# Patient Record
Sex: FEMALE | Race: WHITE | NOT HISPANIC OR LATINO | URBAN - METROPOLITAN AREA
[De-identification: names, ages, dates, MRNs, and addresses within clinical notes are randomized per-mention and may not be internally consistent; named-entity substitution may affect disease eponyms.]

---

## 2020-06-25 ENCOUNTER — OFFICE VISIT (OUTPATIENT)
Dept: URBAN - METROPOLITAN AREA CLINIC 113 | Facility: CLINIC | Age: 35
End: 2020-06-25

## 2020-06-30 ENCOUNTER — CLAIMS CREATED FROM THE CLAIM WINDOW (OUTPATIENT)
Dept: URBAN - METROPOLITAN AREA CLINIC 4 | Facility: CLINIC | Age: 35
End: 2020-06-30
Payer: COMMERCIAL

## 2020-06-30 ENCOUNTER — OFFICE VISIT (OUTPATIENT)
Dept: URBAN - METROPOLITAN AREA SURGERY CENTER 25 | Facility: SURGERY CENTER | Age: 35
End: 2020-06-30

## 2020-06-30 DIAGNOSIS — K22.719 BARRETT'S ESOPHAGUS WITH DYSPLASIA, UNSPECIFIED: ICD-10-CM

## 2020-06-30 DIAGNOSIS — K90.9 INTESTINAL MALABSORPTION, UNSPECIFIED: ICD-10-CM

## 2020-06-30 DIAGNOSIS — R10.9 UNSPECIFIED ABDOMINAL PAIN: ICD-10-CM

## 2020-06-30 PROCEDURE — 88305 TISSUE EXAM BY PATHOLOGIST: CPT | Performed by: PATHOLOGY

## 2020-07-10 ENCOUNTER — OFFICE VISIT (OUTPATIENT)
Dept: URBAN - METROPOLITAN AREA CLINIC 113 | Facility: CLINIC | Age: 35
End: 2020-07-10

## 2020-07-14 ENCOUNTER — OFFICE VISIT (OUTPATIENT)
Dept: URBAN - METROPOLITAN AREA CLINIC 113 | Facility: CLINIC | Age: 35
End: 2020-07-14

## 2020-07-20 ENCOUNTER — OFFICE VISIT (OUTPATIENT)
Dept: URBAN - METROPOLITAN AREA CLINIC 113 | Facility: CLINIC | Age: 35
End: 2020-07-20

## 2020-07-25 ENCOUNTER — TELEPHONE ENCOUNTER (OUTPATIENT)
Dept: URBAN - METROPOLITAN AREA CLINIC 13 | Facility: CLINIC | Age: 35
End: 2020-07-25

## 2020-07-25 RX ORDER — LOSARTAN POTASSIUM 50 MG/1
TAKE 1 TABLET DAILY TABLET, FILM COATED ORAL
Refills: 0 | OUTPATIENT
End: 2020-06-25

## 2020-07-25 RX ORDER — DICYCLOMINE HYDROCHLORIDE 20 MG/1
TAKE 1 TABLET EVERY 6 HOURS PRN TABLET ORAL
Refills: 0 | OUTPATIENT
Start: 2020-05-12 | End: 2020-07-20

## 2020-07-25 RX ORDER — PANTOPRAZOLE SODIUM 40 MG/1
TAKE 1 CAPSULE BY MOUTH ONCE DAILY TABLET, DELAYED RELEASE ORAL
Qty: 30 | Refills: 3 | OUTPATIENT
Start: 2020-04-28 | End: 2020-06-25

## 2020-07-25 RX ORDER — ONDANSETRON 4 MG/1
DISSOLVE 1 TABLET BY MOUTH EVERY 6 HOURS AS NEEDED FOR NAUSEA TABLET, ORALLY DISINTEGRATING ORAL
Qty: 30 | Refills: 0 | OUTPATIENT
Start: 2020-04-28 | End: 2020-06-25

## 2020-07-25 RX ORDER — METOCLOPRAMIDE 10 MG/1
TAKE 1 TABLET BY MOUTH TWICE A DAY TABLET ORAL
Qty: 20 | Refills: 0 | OUTPATIENT
Start: 2020-05-12 | End: 2020-06-30

## 2020-07-26 ENCOUNTER — TELEPHONE ENCOUNTER (OUTPATIENT)
Dept: URBAN - METROPOLITAN AREA CLINIC 13 | Facility: CLINIC | Age: 35
End: 2020-07-26

## 2020-07-26 RX ORDER — FLUOXETINE HCL 20 MG
TAKE 1 CAPSULE DAILY CAPSULE ORAL
Refills: 0 | Status: ACTIVE | COMMUNITY

## 2020-07-26 RX ORDER — PROMETHAZINE HYDROCHLORIDE 25 MG/1
INSERT 1 SUPPOSITORY RECTALLY EVERY 6 HOURS AS NEEDED FOR NAUSEA/VOMIT SUPPOSITORY RECTAL
Qty: 10 | Refills: 0 | Status: ACTIVE | COMMUNITY
Start: 2020-05-12

## 2020-07-26 RX ORDER — ONDANSETRON 8 MG/1
PLACE 1 TAB UNDER TONGUE EVERY 8 HOURS AS NEEDED FOR NAUSEA/VOMITING TABLET, ORALLY DISINTEGRATING ORAL
Qty: 8 | Refills: 0 | Status: ACTIVE | COMMUNITY
Start: 2019-07-27

## 2020-07-26 RX ORDER — CIPROFLOXACIN AND DEXAMETHASONE 3; 1 MG/ML; MG/ML
PLACE 4 TO 5 DROPS IN LEFT EAR TWICE A DAY FOR 7 DAYS SUSPENSION/ DROPS AURICULAR (OTIC)
Qty: 8 | Refills: 0 | Status: ACTIVE | COMMUNITY
Start: 2020-05-18

## 2020-07-26 RX ORDER — HYOSCYAMINE SULFATE 0.12 MG/1
TABLET, ORALLY DISINTEGRATING ORAL
Refills: 0 | Status: ACTIVE | COMMUNITY

## 2020-07-26 RX ORDER — PANTOPRAZOLE SODIUM 40 MG/1
TAKE 1 TABLET TWICE DAILY TABLET, DELAYED RELEASE ORAL
Qty: 60 | Refills: 11 | Status: ACTIVE | COMMUNITY
Start: 2020-06-30

## 2020-07-26 RX ORDER — LEVOTHYROXINE SODIUM 175 UG/1
TAKE 1 TABLET DAILY TABLET ORAL
Refills: 0 | Status: ACTIVE | COMMUNITY

## 2020-07-26 RX ORDER — ALPRAZOLAM 0.5 MG
TAKE 1 TABLET 3 TIMES DAILY AS NEEDED TABLET ORAL
Refills: 0 | Status: ACTIVE | COMMUNITY

## 2020-07-26 RX ORDER — NYSTATIN 100000 [USP'U]/ML
TAKE 5 ML BY MOUTH 3 TIMES A DAY SUSPENSION ORAL
Qty: 60 | Refills: 0 | Status: ACTIVE | COMMUNITY
Start: 2020-04-08

## 2020-07-26 RX ORDER — NORETHINDRONE ACETATE AND ETHINYL ESTRADIOL 1; 20 MG/1; UG/1
TAKE 1 TABLET DAILY TABLET ORAL
Refills: 0 | Status: ACTIVE | COMMUNITY

## 2020-07-26 RX ORDER — SODIUM SULFATE, POTASSIUM SULFATE, MAGNESIUM SULFATE 17.5; 3.13; 1.6 G/ML; G/ML; G/ML
USE AS DIRECTED SOLUTION, CONCENTRATE ORAL
Qty: 1 | Refills: 0 | Status: ACTIVE | COMMUNITY
Start: 2020-07-14

## 2020-07-26 RX ORDER — ERGOCALCIFEROL 1.25 MG/1
TAKE 1 CAPSULE ONCE PER WEEK CAPSULE ORAL
Qty: 4 | Refills: 0 | Status: ACTIVE | COMMUNITY

## 2020-07-26 RX ORDER — PROMETHAZINE HYDROCHLORIDE AND DEXTROMETHORPHAN HYDROBROMIDE 6.25; 15 MG/5ML; MG/5ML
TAKE 5 ML BY MOUTH EVERY 6 HOURS AS NEEDED FOR COUGH FOR 3 DAYS SOLUTION ORAL
Qty: 60 | Refills: 0 | Status: ACTIVE | COMMUNITY
Start: 2019-05-28

## 2020-07-30 ENCOUNTER — OFFICE VISIT (OUTPATIENT)
Dept: URBAN - METROPOLITAN AREA CLINIC 13 | Facility: CLINIC | Age: 35
End: 2020-07-30

## 2020-09-03 ENCOUNTER — OFFICE VISIT (OUTPATIENT)
Dept: URBAN - METROPOLITAN AREA SURGERY CENTER 25 | Facility: SURGERY CENTER | Age: 35
End: 2020-09-03
Payer: COMMERCIAL

## 2020-09-03 DIAGNOSIS — R93.3 ABN FINDINGS-GI TRACT: ICD-10-CM

## 2020-09-03 PROCEDURE — 45378 DIAGNOSTIC COLONOSCOPY: CPT | Performed by: INTERNAL MEDICINE

## 2020-09-03 RX ORDER — NORETHINDRONE ACETATE AND ETHINYL ESTRADIOL 1; 20 MG/1; UG/1
TAKE 1 TABLET DAILY TABLET ORAL
Refills: 0 | Status: ACTIVE | COMMUNITY

## 2020-09-03 RX ORDER — CIPROFLOXACIN AND DEXAMETHASONE 3; 1 MG/ML; MG/ML
PLACE 4 TO 5 DROPS IN LEFT EAR TWICE A DAY FOR 7 DAYS SUSPENSION/ DROPS AURICULAR (OTIC)
Qty: 8 | Refills: 0 | Status: ACTIVE | COMMUNITY
Start: 2020-05-18

## 2020-09-03 RX ORDER — ONDANSETRON 8 MG/1
PLACE 1 TAB UNDER TONGUE EVERY 8 HOURS AS NEEDED FOR NAUSEA/VOMITING TABLET, ORALLY DISINTEGRATING ORAL
Qty: 8 | Refills: 0 | Status: ACTIVE | COMMUNITY
Start: 2019-07-27

## 2020-09-03 RX ORDER — FLUOXETINE HCL 20 MG
TAKE 1 CAPSULE DAILY CAPSULE ORAL
Refills: 0 | Status: ACTIVE | COMMUNITY

## 2020-09-03 RX ORDER — ERGOCALCIFEROL 1.25 MG/1
TAKE 1 CAPSULE ONCE PER WEEK CAPSULE ORAL
Qty: 4 | Refills: 0 | Status: ACTIVE | COMMUNITY

## 2020-09-03 RX ORDER — ALPRAZOLAM 0.5 MG
TAKE 1 TABLET 3 TIMES DAILY AS NEEDED TABLET ORAL
Refills: 0 | Status: ACTIVE | COMMUNITY

## 2020-09-03 RX ORDER — LEVOTHYROXINE SODIUM 175 UG/1
TAKE 1 TABLET DAILY TABLET ORAL
Refills: 0 | Status: ACTIVE | COMMUNITY

## 2020-09-03 RX ORDER — SODIUM SULFATE, POTASSIUM SULFATE, MAGNESIUM SULFATE 17.5; 3.13; 1.6 G/ML; G/ML; G/ML
USE AS DIRECTED SOLUTION, CONCENTRATE ORAL
Qty: 1 | Refills: 0 | Status: ACTIVE | COMMUNITY
Start: 2020-07-14

## 2020-09-03 RX ORDER — NYSTATIN 100000 [USP'U]/ML
TAKE 5 ML BY MOUTH 3 TIMES A DAY SUSPENSION ORAL
Qty: 60 | Refills: 0 | Status: ACTIVE | COMMUNITY
Start: 2020-04-08

## 2020-09-03 RX ORDER — PROMETHAZINE HYDROCHLORIDE 25 MG/1
INSERT 1 SUPPOSITORY RECTALLY EVERY 6 HOURS AS NEEDED FOR NAUSEA/VOMIT SUPPOSITORY RECTAL
Qty: 10 | Refills: 0 | Status: ACTIVE | COMMUNITY
Start: 2020-05-12

## 2020-09-03 RX ORDER — HYOSCYAMINE SULFATE 0.12 MG/1
TABLET, ORALLY DISINTEGRATING ORAL
Refills: 0 | Status: ACTIVE | COMMUNITY

## 2020-09-03 RX ORDER — PROMETHAZINE HYDROCHLORIDE AND DEXTROMETHORPHAN HYDROBROMIDE 6.25; 15 MG/5ML; MG/5ML
TAKE 5 ML BY MOUTH EVERY 6 HOURS AS NEEDED FOR COUGH FOR 3 DAYS SOLUTION ORAL
Qty: 60 | Refills: 0 | Status: ACTIVE | COMMUNITY
Start: 2019-05-28

## 2020-09-03 RX ORDER — PANTOPRAZOLE SODIUM 40 MG/1
TAKE 1 TABLET TWICE DAILY TABLET, DELAYED RELEASE ORAL
Qty: 60 | Refills: 11 | Status: ACTIVE | COMMUNITY
Start: 2020-06-30

## 2020-10-19 ENCOUNTER — WEB ENCOUNTER (OUTPATIENT)
Dept: URBAN - METROPOLITAN AREA CLINIC 113 | Facility: CLINIC | Age: 35
End: 2020-10-19

## 2020-10-19 ENCOUNTER — OFFICE VISIT (OUTPATIENT)
Dept: URBAN - METROPOLITAN AREA CLINIC 113 | Facility: CLINIC | Age: 35
End: 2020-10-19
Payer: COMMERCIAL

## 2020-10-19 VITALS
DIASTOLIC BLOOD PRESSURE: 83 MMHG | WEIGHT: 239 LBS | BODY MASS INDEX: 43.98 KG/M2 | TEMPERATURE: 97.8 F | RESPIRATION RATE: 18 BRPM | HEART RATE: 58 BPM | HEIGHT: 62 IN | SYSTOLIC BLOOD PRESSURE: 125 MMHG

## 2020-10-19 DIAGNOSIS — K31.84 GASTROPARESIS: ICD-10-CM

## 2020-10-19 DIAGNOSIS — R10.9 UNSPECIFIED ABDOMINAL PAIN: ICD-10-CM

## 2020-10-19 PROBLEM — 302914006 BARRETT'S ESOPHAGUS: Status: ACTIVE | Noted: 2020-10-19

## 2020-10-19 PROBLEM — 235675006 GASTROPARESIS: Status: ACTIVE | Noted: 2020-10-19

## 2020-10-19 PROBLEM — 21522001 ABDOMINAL PAIN: Status: ACTIVE | Noted: 2020-10-19

## 2020-10-19 PROCEDURE — 99213 OFFICE O/P EST LOW 20 MIN: CPT | Performed by: NURSE PRACTITIONER

## 2020-10-19 PROCEDURE — G8430 EC AT DOC MEDREC PT NOT ELIG: HCPCS | Performed by: NURSE PRACTITIONER

## 2020-10-19 PROCEDURE — G8483 FLU IMM NO ADMIN DOC REA: HCPCS | Performed by: NURSE PRACTITIONER

## 2020-10-19 PROCEDURE — G9903 PT SCRN TBCO ID AS NON USER: HCPCS | Performed by: NURSE PRACTITIONER

## 2020-10-19 RX ORDER — SODIUM SULFATE, POTASSIUM SULFATE, MAGNESIUM SULFATE 17.5; 3.13; 1.6 G/ML; G/ML; G/ML
USE AS DIRECTED SOLUTION, CONCENTRATE ORAL
Qty: 1 | Refills: 0 | Status: DISCONTINUED | COMMUNITY
Start: 2020-07-14

## 2020-10-19 RX ORDER — NYSTATIN 100000 [USP'U]/ML
TAKE 5 ML BY MOUTH 3 TIMES A DAY SUSPENSION ORAL
Qty: 60 | Refills: 0 | Status: DISCONTINUED | COMMUNITY
Start: 2020-04-08

## 2020-10-19 RX ORDER — CIPROFLOXACIN AND DEXAMETHASONE 3; 1 MG/ML; MG/ML
PLACE 4 TO 5 DROPS IN LEFT EAR TWICE A DAY FOR 7 DAYS SUSPENSION/ DROPS AURICULAR (OTIC)
Qty: 8 | Refills: 0 | Status: ACTIVE | COMMUNITY
Start: 2020-05-18

## 2020-10-19 RX ORDER — PANTOPRAZOLE SODIUM 40 MG/1
TAKE 1 TABLET TWICE DAILY TABLET, DELAYED RELEASE ORAL
Qty: 60 | Refills: 11 | Status: ACTIVE | COMMUNITY
Start: 2020-06-30

## 2020-10-19 RX ORDER — PROMETHAZINE HYDROCHLORIDE 25 MG/1
INSERT 1 SUPPOSITORY RECTALLY EVERY 6 HOURS AS NEEDED FOR NAUSEA/VOMIT SUPPOSITORY RECTAL
Qty: 10 | Refills: 0 | Status: ON HOLD | COMMUNITY
Start: 2020-05-12

## 2020-10-19 RX ORDER — ONDANSETRON 8 MG/1
PLACE 1 TAB UNDER TONGUE EVERY 8 HOURS AS NEEDED FOR NAUSEA/VOMITING TABLET, ORALLY DISINTEGRATING ORAL
Qty: 8 | Refills: 0 | Status: DISCONTINUED | COMMUNITY
Start: 2019-07-27

## 2020-10-19 RX ORDER — ERGOCALCIFEROL 1.25 MG/1
TAKE 1 CAPSULE ONCE PER WEEK CAPSULE ORAL
Qty: 4 | Refills: 0 | Status: ACTIVE | COMMUNITY

## 2020-10-19 RX ORDER — ALPRAZOLAM 0.5 MG
TAKE 1 TABLET 3 TIMES DAILY AS NEEDED TABLET ORAL
Refills: 0 | Status: ON HOLD | COMMUNITY

## 2020-10-19 RX ORDER — PROMETHAZINE HYDROCHLORIDE AND DEXTROMETHORPHAN HYDROBROMIDE 6.25; 15 MG/5ML; MG/5ML
TAKE 5 ML BY MOUTH EVERY 6 HOURS AS NEEDED FOR COUGH FOR 3 DAYS SOLUTION ORAL
Qty: 60 | Refills: 0 | Status: ACTIVE | COMMUNITY
Start: 2019-05-28

## 2020-10-19 RX ORDER — NORETHINDRONE ACETATE AND ETHINYL ESTRADIOL 1; 20 MG/1; UG/1
TAKE 1 TABLET DAILY TABLET ORAL
Refills: 0 | Status: ACTIVE | COMMUNITY

## 2020-10-19 RX ORDER — HYOSCYAMINE SULFATE 0.12 MG/1
TABLET, ORALLY DISINTEGRATING ORAL
Refills: 0 | Status: DISCONTINUED | COMMUNITY

## 2020-10-19 RX ORDER — LEVOTHYROXINE SODIUM 175 UG/1
TAKE 1 TABLET DAILY TABLET ORAL
Refills: 0 | Status: ACTIVE | COMMUNITY

## 2020-10-19 RX ORDER — FLUOXETINE HCL 20 MG
TAKE 1 CAPSULE DAILY CAPSULE ORAL
Refills: 0 | Status: ACTIVE | COMMUNITY

## 2020-10-19 NOTE — HPI-TODAY'S VISIT:
Ms. Will is a 35-year-old female with a history of hypertension and hypothyroidism, longstanding history of intermittent episodes of nausea and vomiting, presenting for 3-month follow-up. She was last seen in the office on 7/20/2020 regarding chronic, intermittent nausea and vomiting.  Work-up had included labs, CT imaging and EGD.  EGD was notable for LA grade B esophagitis, and GE junction biopsies demonstrating focal intestinal metaplasia consistent with Jha's esophagus.  Labs were unremarkable for pancreas or liver disease, or celiac disease.  She had correlated episodes of nausea and vomiting with hormonal fluctuation associated with menstrual cycles.  She was planned for a 4-hour gastric emptying study.  She was to continue supportive treatment with pantoprazole 40 mg daily, promethazine as needed and Levsin as needed.  She was to proceed with colonoscopy as scheduled to further evaluate CT evidence of colonic thickening. Colonoscopy was performed 9/3/2020 which was notable for an excellent bowel preparation, mild to moderate sigmoid diverticulosis, mild external and internal hemorrhoids, normal ileum, otherwise normal colonoscopy to the terminal ileum. Gastric emptying study on 7/30/2020 demonstrated findings consistent with delayed gastric emptying with grade 4 (very severe) gastric retention at 4 hours.  Since learning of her gastroparesis, she started following a diet for gastroparesis and has noted significant improvement. She has not had any recurrent episodes of nausea since making these changes. She is pleased with her weight loss as well. No abdominal pain. Bowels are moving normally. No blood per rectum.

## 2021-07-08 ENCOUNTER — ERX REFILL RESPONSE (OUTPATIENT)
Dept: URBAN - METROPOLITAN AREA CLINIC 113 | Facility: CLINIC | Age: 36
End: 2021-07-08

## 2021-07-08 RX ORDER — PANTOPRAZOLE SODIUM 40 MG/1
TAKE ONE TABLET BY MOUTH TWICE A DAY TABLET, DELAYED RELEASE ORAL
Qty: 60 TABLET | Refills: 11 | OUTPATIENT

## 2022-03-17 ENCOUNTER — ERX REFILL RESPONSE (OUTPATIENT)
Dept: URBAN - METROPOLITAN AREA CLINIC 113 | Facility: CLINIC | Age: 37
End: 2022-03-17

## 2022-03-17 RX ORDER — PANTOPRAZOLE SODIUM 40 MG/1
TAKE ONE TABLET BY MOUTH TWICE A DAY TABLET, DELAYED RELEASE ORAL
Qty: 180 TABLET | Refills: 4 | OUTPATIENT

## 2022-03-17 RX ORDER — PANTOPRAZOLE SODIUM 40 MG/1
TAKE ONE TABLET BY MOUTH TWICE A DAY TABLET, DELAYED RELEASE ORAL
Qty: 180 TABLET | Refills: 0 | OUTPATIENT

## 2023-04-13 ENCOUNTER — TELEPHONE ENCOUNTER (OUTPATIENT)
Dept: URBAN - METROPOLITAN AREA CLINIC 113 | Facility: CLINIC | Age: 38
End: 2023-04-13

## 2023-04-13 RX ORDER — PANTOPRAZOLE SODIUM 40 MG/1
TAKE ONE TABLET BY MOUTH TWICE A DAY TABLET, DELAYED RELEASE ORAL
Qty: 30 | Refills: 0

## 2023-05-16 ENCOUNTER — DASHBOARD ENCOUNTERS (OUTPATIENT)
Age: 38
End: 2023-05-16

## 2023-05-16 ENCOUNTER — OFFICE VISIT (OUTPATIENT)
Dept: URBAN - METROPOLITAN AREA CLINIC 113 | Facility: CLINIC | Age: 38
End: 2023-05-16
Payer: COMMERCIAL

## 2023-05-16 VITALS
TEMPERATURE: 97.5 F | WEIGHT: 274 LBS | HEART RATE: 75 BPM | DIASTOLIC BLOOD PRESSURE: 92 MMHG | BODY MASS INDEX: 50.42 KG/M2 | HEIGHT: 62 IN | RESPIRATION RATE: 14 BRPM | SYSTOLIC BLOOD PRESSURE: 137 MMHG

## 2023-05-16 DIAGNOSIS — K31.84 GASTROPARESIS: ICD-10-CM

## 2023-05-16 DIAGNOSIS — K21.9 GERD WITHOUT ESOPHAGITIS: ICD-10-CM

## 2023-05-16 DIAGNOSIS — R63.5 WEIGHT GAIN: ICD-10-CM

## 2023-05-16 PROBLEM — 266435005: Status: ACTIVE | Noted: 2023-05-16

## 2023-05-16 PROCEDURE — 99213 OFFICE O/P EST LOW 20 MIN: CPT | Performed by: INTERNAL MEDICINE

## 2023-05-16 RX ORDER — PROMETHAZINE HYDROCHLORIDE 25 MG/1
INSERT 1 SUPPOSITORY RECTALLY EVERY 6 HOURS AS NEEDED FOR NAUSEA/VOMIT SUPPOSITORY RECTAL
Qty: 10 | Refills: 0 | Status: ON HOLD | COMMUNITY
Start: 2020-05-12

## 2023-05-16 RX ORDER — PANTOPRAZOLE SODIUM 40 MG/1
TAKE ONE TABLET BY MOUTH TWICE A DAY TABLET, DELAYED RELEASE ORAL
Qty: 30 | Refills: 0 | Status: ACTIVE | COMMUNITY

## 2023-05-16 RX ORDER — PANTOPRAZOLE SODIUM 40 MG/1
1 TABLET TABLET, DELAYED RELEASE ORAL ONCE A DAY
Qty: 90 TABLET | Refills: 3

## 2023-05-16 RX ORDER — AMLODIPINE BESYLATE 5 MG/1
1 TABLET TABLET ORAL ONCE A DAY
Status: ACTIVE | COMMUNITY

## 2023-05-16 RX ORDER — FLUOXETINE HCL 20 MG
TAKE 1 CAPSULE DAILY CAPSULE ORAL
Refills: 0 | Status: ACTIVE | COMMUNITY

## 2023-05-16 RX ORDER — PROMETHAZINE HYDROCHLORIDE AND DEXTROMETHORPHAN HYDROBROMIDE 6.25; 15 MG/5ML; MG/5ML
TAKE 5 ML BY MOUTH EVERY 6 HOURS AS NEEDED FOR COUGH FOR 3 DAYS SOLUTION ORAL
Qty: 60 | Refills: 0 | Status: ON HOLD | COMMUNITY
Start: 2019-05-28

## 2023-05-16 RX ORDER — CIPROFLOXACIN AND DEXAMETHASONE 3; 1 MG/ML; MG/ML
PLACE 4 TO 5 DROPS IN LEFT EAR TWICE A DAY FOR 7 DAYS SUSPENSION/ DROPS AURICULAR (OTIC)
Qty: 8 | Refills: 0 | Status: ON HOLD | COMMUNITY
Start: 2020-05-18

## 2023-05-16 RX ORDER — ERGOCALCIFEROL 1.25 MG/1
TAKE 1 CAPSULE ONCE PER WEEK CAPSULE ORAL
Qty: 4 | Refills: 0 | Status: ON HOLD | COMMUNITY

## 2023-05-16 RX ORDER — CLONIDINE 0.1 MG/D
1 PATCH TO SKIN PATCH TRANSDERMAL
Status: ACTIVE | COMMUNITY

## 2023-05-16 RX ORDER — NORETHINDRONE ACETATE AND ETHINYL ESTRADIOL 1; 20 MG/1; UG/1
TAKE 1 TABLET DAILY TABLET ORAL
Refills: 0 | Status: ACTIVE | COMMUNITY

## 2023-05-16 RX ORDER — LEVOTHYROXINE SODIUM 175 UG/1
TAKE 1 TABLET DAILY TABLET ORAL
Refills: 0 | Status: ACTIVE | COMMUNITY

## 2023-05-16 RX ORDER — ALPRAZOLAM 0.5 MG
TAKE 1 TABLET 3 TIMES DAILY AS NEEDED TABLET ORAL
Refills: 0 | Status: ON HOLD | COMMUNITY

## 2023-05-16 NOTE — HPI-TODAY'S VISIT:
Ms. Will is a 37-year-old female with a history of hypertension and hypothyroidism, longstanding history of intermittent episodes of nausea and vomiting, presenting for long interval follow up regarding medication refill.  She is feeling well. She continues to avoid foods difficult to digest such as salads and raw vegetables. No nausea or vomiting. She is taking pantoprazole 40 mg daily. No dysphagia or heartburn. Her wieght is increasing. She is up about 30 pounds since we have last seen her. She has been hesitant to start semiglutide injections offered to her by Dr. Mcclellan. She has tried counting calories in the past without longterm success. She is interested in trying to minimize her carbohydrate intake and increasing her exercise regimen.

## 2023-05-16 NOTE — HPI-OTHER HISTORIES
EGD was notable for LA grade B esophagitis, and GE junction biopsies demonstrating focal intestinal metaplasia consistent with Jha's esophagus.   . Labs were unremarkable for pancreas or liver disease, or celiac disease.  . Colonoscopy was performed 9/3/2020 which was notable for an excellent bowel preparation, mild to moderate sigmoid diverticulosis, mild external and internal hemorrhoids, normal ileum, otherwise normal colonoscopy to the terminal ileum. . Gastric emptying study on 7/30/2020 demonstrated findings consistent with delayed gastric emptying with grade 4 (very severe) gastric retention at 4 hours.

## 2023-07-10 ENCOUNTER — TELEPHONE ENCOUNTER (OUTPATIENT)
Dept: URBAN - METROPOLITAN AREA CLINIC 113 | Facility: CLINIC | Age: 38
End: 2023-07-10

## 2023-07-10 RX ORDER — PANTOPRAZOLE SODIUM 40 MG/1
1 TABLET TABLET, DELAYED RELEASE ORAL ONCE A DAY
Qty: 90 TABLET | Refills: 3

## 2023-08-23 ENCOUNTER — TELEPHONE ENCOUNTER (OUTPATIENT)
Dept: URBAN - METROPOLITAN AREA CLINIC 113 | Facility: CLINIC | Age: 38
End: 2023-08-23

## 2023-08-23 RX ORDER — PANTOPRAZOLE SODIUM 40 MG/1
1 TABLET TABLET, DELAYED RELEASE ORAL TWICE DAILY
Qty: 180 | Refills: 3

## 2024-07-29 ENCOUNTER — OFFICE VISIT (OUTPATIENT)
Dept: URBAN - METROPOLITAN AREA CLINIC 107 | Facility: CLINIC | Age: 39
End: 2024-07-29
Payer: COMMERCIAL

## 2024-07-29 ENCOUNTER — TELEPHONE ENCOUNTER (OUTPATIENT)
Dept: URBAN - METROPOLITAN AREA CLINIC 107 | Facility: CLINIC | Age: 39
End: 2024-07-29

## 2024-07-29 ENCOUNTER — LAB OUTSIDE AN ENCOUNTER (OUTPATIENT)
Dept: URBAN - METROPOLITAN AREA CLINIC 107 | Facility: CLINIC | Age: 39
End: 2024-07-29

## 2024-07-29 VITALS
HEART RATE: 74 BPM | WEIGHT: 245 LBS | SYSTOLIC BLOOD PRESSURE: 160 MMHG | HEIGHT: 62 IN | TEMPERATURE: 97.9 F | OXYGEN SATURATION: 98 % | DIASTOLIC BLOOD PRESSURE: 94 MMHG | RESPIRATION RATE: 18 BRPM | BODY MASS INDEX: 45.08 KG/M2

## 2024-07-29 DIAGNOSIS — R11.2 NAUSEA AND VOMITING, UNSPECIFIED VOMITING TYPE: ICD-10-CM

## 2024-07-29 DIAGNOSIS — K21.00 GASTROESOPHAGEAL REFLUX DISEASE WITH ESOPHAGITIS WITHOUT HEMORRHAGE: ICD-10-CM

## 2024-07-29 DIAGNOSIS — K31.84 GASTROPARESIS: ICD-10-CM

## 2024-07-29 PROBLEM — 266433003: Status: ACTIVE | Noted: 2024-07-29

## 2024-07-29 PROCEDURE — 99214 OFFICE O/P EST MOD 30 MIN: CPT | Performed by: NURSE PRACTITIONER

## 2024-07-29 RX ORDER — PANTOPRAZOLE SODIUM 40 MG/1
1 TABLET TABLET, DELAYED RELEASE ORAL TWICE DAILY
Qty: 180 | Refills: 3

## 2024-07-29 RX ORDER — PROMETHAZINE HYDROCHLORIDE AND DEXTROMETHORPHAN HYDROBROMIDE 6.25; 15 MG/5ML; MG/5ML
TAKE 5 ML BY MOUTH EVERY 6 HOURS AS NEEDED FOR COUGH FOR 3 DAYS SOLUTION ORAL
Qty: 60 | Refills: 0 | Status: ON HOLD | COMMUNITY
Start: 2019-05-28

## 2024-07-29 RX ORDER — PROCHLORPERAZINE 25 MG/1
1 SUPPOSITORY AS NEEDED SUPPOSITORY RECTAL TWICE A DAY
Qty: 60 | Refills: 3 | OUTPATIENT
Start: 2024-07-31

## 2024-07-29 RX ORDER — LEVOTHYROXINE SODIUM 175 UG/1
TAKE 1 TABLET DAILY TABLET ORAL
Refills: 0 | Status: ACTIVE | COMMUNITY

## 2024-07-29 RX ORDER — AMLODIPINE BESYLATE 5 MG/1
1 TABLET TABLET ORAL ONCE A DAY
Status: ON HOLD | COMMUNITY

## 2024-07-29 RX ORDER — ERGOCALCIFEROL 1.25 MG/1
TAKE 1 CAPSULE ONCE PER WEEK CAPSULE ORAL
Qty: 4 | Refills: 0 | Status: ON HOLD | COMMUNITY

## 2024-07-29 RX ORDER — SPIRONOLACTONE 25 MG/1
1 TABLET TABLET, FILM COATED ORAL
Status: ACTIVE | COMMUNITY

## 2024-07-29 RX ORDER — PROMETHAZINE HYDROCHLORIDE 25 MG/1
INSERT 1 SUPPOSITORY RECTALLY EVERY 6 HOURS AS NEEDED FOR NAUSEA/VOMIT SUPPOSITORY RECTAL
Qty: 10 | Refills: 0 | Status: ON HOLD | COMMUNITY
Start: 2020-05-12

## 2024-07-29 RX ORDER — ALPRAZOLAM 0.5 MG
TAKE 1 TABLET 3 TIMES DAILY AS NEEDED TABLET ORAL
Refills: 0 | Status: ON HOLD | COMMUNITY

## 2024-07-29 RX ORDER — FLUOXETINE HCL 20 MG
TAKE 1 CAPSULE DAILY CAPSULE ORAL
Refills: 0 | Status: ACTIVE | COMMUNITY

## 2024-07-29 RX ORDER — NORETHINDRONE ACETATE AND ETHINYL ESTRADIOL 1; 20 MG/1; UG/1
TAKE 1 TABLET DAILY TABLET ORAL
Refills: 0 | Status: ACTIVE | COMMUNITY

## 2024-07-29 RX ORDER — PROMETHAZINE HYDROCHLORIDE 25 MG/1
1 SUPPOSITORY AS NEEDED SUPPOSITORY RECTAL
Qty: 30 | Refills: 1 | OUTPATIENT
Start: 2024-07-29 | End: 2024-09-27

## 2024-07-29 RX ORDER — CIPROFLOXACIN AND DEXAMETHASONE 3; 1 MG/ML; MG/ML
PLACE 4 TO 5 DROPS IN LEFT EAR TWICE A DAY FOR 7 DAYS SUSPENSION/ DROPS AURICULAR (OTIC)
Qty: 8 | Refills: 0 | Status: ON HOLD | COMMUNITY
Start: 2020-05-18

## 2024-07-29 RX ORDER — PANTOPRAZOLE SODIUM 40 MG/1
1 TABLET TABLET, DELAYED RELEASE ORAL TWICE DAILY
Qty: 180 | Refills: 3 | Status: ACTIVE | COMMUNITY

## 2024-07-29 RX ORDER — CLONIDINE 0.1 MG/D
1 PATCH TO SKIN PATCH TRANSDERMAL
Status: ACTIVE | COMMUNITY

## 2024-07-29 NOTE — HPI-OTHER HISTORIES
EGD 2020 was notable for LA grade B esophagitis, and GE junction biopsies demonstrating focal intestinal metaplasia consistent with Jha's esophagus.   . Labs 2020were unremarkable for pancreas or liver disease, or celiac disease.  . Colonoscopy was performed 9/3/2020 which was notable for an excellent bowel preparation, mild to moderate sigmoid diverticulosis, mild external and internal hemorrhoids, normal ileum, otherwise normal colonoscopy to the terminal ileum. . Gastric emptying study on 7/30/2020 demonstrated findings consistent with delayed gastric emptying with grade 4 (very severe) gastric retention at 4 hours.

## 2024-07-29 NOTE — HPI-TODAY'S VISIT:
Ms. Will is a 38-year-old female with a history of hypertension and hypothyroidism, longstanding history of intermittent episodes of nausea and vomiting, presenting for long interval follow up regarding medication refill.  Last seen 5/16/2023 for prescription refill for GERD without esophagitis, gastroparesis, weight gain.  She is feeling well. She continues to avoid foods difficult to digest such as salads and raw vegetables. No nausea or vomiting. She is taking pantoprazole 40 mg daily. No dysphagia or heartburn. Her weight is increasing. She is up about 30 pounds since we have last seen her. She has been hesitant to start semaglutide injections offered to her by Dr. Mcclellan. She has tried counting calories in the past without long-term success. She is interested in trying to minimize her carbohydrate intake and increasing her exercise regimen. At that time GERD symptoms are controlled with pantoprazole 40 mg daily.  Weight loss encouraged pantoprazole refilled.  Was noted to be on pantoprazole 40 mg twice a day in the fall tried cutting down to once a day but nausea returned.  She reports she was doing better but was at [facility] for over a week and then has been back to the ER in [place] on Friday, was given IV fluids with Zofran, morphine and that didn't work was given promethazine and Dilaudid. Has been doing ok since. She is following a low residue diet and is not eating much.  She is concerned about some swelling in the back of her right wrist.  Just appeared today.  She reports she did not have an IV or blood draw in the area.  She had similar episode in March was treated with steroids.  She is wondering if she should get gastric electrical stimulation device.  No melena or hematochezia.  No constipation.  Weight is down 30 pounds from her last appointment

## 2024-08-30 ENCOUNTER — OFFICE VISIT (OUTPATIENT)
Dept: URBAN - METROPOLITAN AREA SURGERY CENTER 25 | Facility: SURGERY CENTER | Age: 39
End: 2024-08-30

## 2024-08-30 ENCOUNTER — TELEPHONE ENCOUNTER (OUTPATIENT)
Dept: URBAN - METROPOLITAN AREA CLINIC 113 | Facility: CLINIC | Age: 39
End: 2024-08-30

## 2024-08-30 ENCOUNTER — LAB OUTSIDE AN ENCOUNTER (OUTPATIENT)
Dept: URBAN - METROPOLITAN AREA CLINIC 113 | Facility: CLINIC | Age: 39
End: 2024-08-30

## 2024-08-30 RX ORDER — PROMETHAZINE HYDROCHLORIDE AND DEXTROMETHORPHAN HYDROBROMIDE 6.25; 15 MG/5ML; MG/5ML
TAKE 5 ML BY MOUTH EVERY 6 HOURS AS NEEDED FOR COUGH FOR 3 DAYS SOLUTION ORAL
Qty: 60 | Refills: 0 | Status: ON HOLD | COMMUNITY
Start: 2019-05-28

## 2024-08-30 RX ORDER — CLONIDINE 0.1 MG/D
1 PATCH TO SKIN PATCH TRANSDERMAL
Status: ACTIVE | COMMUNITY

## 2024-08-30 RX ORDER — SPIRONOLACTONE 25 MG/1
1 TABLET TABLET, FILM COATED ORAL
Status: ACTIVE | COMMUNITY

## 2024-08-30 RX ORDER — PROCHLORPERAZINE 25 MG/1
1 SUPPOSITORY AS NEEDED SUPPOSITORY RECTAL TWICE A DAY
Qty: 60 | Refills: 3 | Status: ACTIVE | COMMUNITY
Start: 2024-07-31

## 2024-08-30 RX ORDER — CIPROFLOXACIN AND DEXAMETHASONE 3; 1 MG/ML; MG/ML
PLACE 4 TO 5 DROPS IN LEFT EAR TWICE A DAY FOR 7 DAYS SUSPENSION/ DROPS AURICULAR (OTIC)
Qty: 8 | Refills: 0 | Status: ON HOLD | COMMUNITY
Start: 2020-05-18

## 2024-08-30 RX ORDER — PANTOPRAZOLE SODIUM 40 MG/1
1 TABLET TABLET, DELAYED RELEASE ORAL TWICE DAILY
Qty: 180 | Refills: 3 | Status: ACTIVE | COMMUNITY

## 2024-08-30 RX ORDER — PROMETHAZINE HYDROCHLORIDE 25 MG/1
1 SUPPOSITORY AS NEEDED SUPPOSITORY RECTAL
Qty: 30 | Refills: 1 | Status: ACTIVE | COMMUNITY
Start: 2024-07-29 | End: 2024-09-27

## 2024-08-30 RX ORDER — LEVOTHYROXINE SODIUM 175 UG/1
TAKE 1 TABLET DAILY TABLET ORAL
Refills: 0 | Status: ACTIVE | COMMUNITY

## 2024-08-30 RX ORDER — ALPRAZOLAM 0.5 MG
TAKE 1 TABLET 3 TIMES DAILY AS NEEDED TABLET ORAL
Refills: 0 | Status: ON HOLD | COMMUNITY

## 2024-08-30 RX ORDER — FLUOXETINE HCL 20 MG
TAKE 1 CAPSULE DAILY CAPSULE ORAL
Refills: 0 | Status: ACTIVE | COMMUNITY

## 2024-08-30 RX ORDER — PROMETHAZINE HYDROCHLORIDE 25 MG/1
INSERT 1 SUPPOSITORY RECTALLY EVERY 6 HOURS AS NEEDED FOR NAUSEA/VOMIT SUPPOSITORY RECTAL
Qty: 10 | Refills: 0 | Status: ON HOLD | COMMUNITY
Start: 2020-05-12

## 2024-08-30 RX ORDER — AMLODIPINE BESYLATE 5 MG/1
1 TABLET TABLET ORAL ONCE A DAY
Status: ON HOLD | COMMUNITY

## 2024-08-30 RX ORDER — FAMOTIDINE 40 MG/1
1 TABLET AT BEDTIME TABLET, FILM COATED ORAL ONCE A DAY
Qty: 90 TABLET | Refills: 3 | OUTPATIENT
Start: 2024-08-30

## 2024-08-30 RX ORDER — ERGOCALCIFEROL 1.25 MG/1
TAKE 1 CAPSULE ONCE PER WEEK CAPSULE ORAL
Qty: 4 | Refills: 0 | Status: ON HOLD | COMMUNITY

## 2024-08-30 RX ORDER — NORETHINDRONE ACETATE AND ETHINYL ESTRADIOL 1; 20 MG/1; UG/1
TAKE 1 TABLET DAILY TABLET ORAL
Refills: 0 | Status: ACTIVE | COMMUNITY

## 2024-10-02 ENCOUNTER — OFFICE VISIT (OUTPATIENT)
Dept: URBAN - METROPOLITAN AREA CLINIC 107 | Facility: CLINIC | Age: 39
End: 2024-10-02
Payer: COMMERCIAL

## 2024-10-02 VITALS
OXYGEN SATURATION: 100 % | SYSTOLIC BLOOD PRESSURE: 135 MMHG | BODY MASS INDEX: 46.56 KG/M2 | HEIGHT: 62 IN | DIASTOLIC BLOOD PRESSURE: 100 MMHG | WEIGHT: 253 LBS | HEART RATE: 78 BPM | RESPIRATION RATE: 18 BRPM | TEMPERATURE: 97.7 F

## 2024-10-02 DIAGNOSIS — K31.84 GASTROPARESIS: ICD-10-CM

## 2024-10-02 DIAGNOSIS — K21.9 GERD WITHOUT ESOPHAGITIS: ICD-10-CM

## 2024-10-02 PROCEDURE — 99214 OFFICE O/P EST MOD 30 MIN: CPT | Performed by: NURSE PRACTITIONER

## 2024-10-02 RX ORDER — PROCHLORPERAZINE 25 MG/1
1 SUPPOSITORY AS NEEDED SUPPOSITORY RECTAL TWICE A DAY
Qty: 60 | Refills: 3 | Status: ACTIVE | COMMUNITY
Start: 2024-07-31

## 2024-10-02 RX ORDER — ERGOCALCIFEROL 1.25 MG/1
TAKE 1 CAPSULE ONCE PER WEEK CAPSULE ORAL
Qty: 4 | Refills: 0 | Status: ON HOLD | COMMUNITY

## 2024-10-02 RX ORDER — PANTOPRAZOLE SODIUM 40 MG/1
1 TABLET TABLET, DELAYED RELEASE ORAL TWICE DAILY
Qty: 180 | Refills: 3 | Status: ACTIVE | COMMUNITY

## 2024-10-02 RX ORDER — SPIRONOLACTONE 25 MG/1
1 TABLET TABLET, FILM COATED ORAL
Status: ACTIVE | COMMUNITY

## 2024-10-02 RX ORDER — CLONIDINE 0.1 MG/D
1 PATCH TO SKIN PATCH TRANSDERMAL
Status: ACTIVE | COMMUNITY

## 2024-10-02 RX ORDER — LEVOTHYROXINE SODIUM 175 UG/1
TAKE 1 TABLET DAILY TABLET ORAL
Refills: 0 | Status: ACTIVE | COMMUNITY

## 2024-10-02 RX ORDER — PROMETHAZINE HYDROCHLORIDE AND DEXTROMETHORPHAN HYDROBROMIDE 6.25; 15 MG/5ML; MG/5ML
TAKE 5 ML BY MOUTH EVERY 6 HOURS AS NEEDED FOR COUGH FOR 3 DAYS SOLUTION ORAL
Qty: 60 | Refills: 0 | Status: ON HOLD | COMMUNITY
Start: 2019-05-28

## 2024-10-02 RX ORDER — PROMETHAZINE HYDROCHLORIDE 25 MG/1
INSERT 1 SUPPOSITORY RECTALLY EVERY 6 HOURS AS NEEDED FOR NAUSEA/VOMIT SUPPOSITORY RECTAL
Qty: 10 | Refills: 0 | Status: ON HOLD | COMMUNITY
Start: 2020-05-12

## 2024-10-02 RX ORDER — PROCHLORPERAZINE 25 MG/1
1 SUPPOSITORY AS NEEDED SUPPOSITORY RECTAL TWICE A DAY
OUTPATIENT
Start: 2024-07-31

## 2024-10-02 RX ORDER — PROMETHAZINE HYDROCHLORIDE 25 MG/1
_INSERT 1 SUPPOSITORY RECTALLY EVERY 6 HOURS AS NEEDED FOR NAUSEA/VOMIT SUPPOSITORY RECTAL
OUTPATIENT
Start: 2020-05-12

## 2024-10-02 RX ORDER — CIPROFLOXACIN AND DEXAMETHASONE 3; 1 MG/ML; MG/ML
PLACE 4 TO 5 DROPS IN LEFT EAR TWICE A DAY FOR 7 DAYS SUSPENSION/ DROPS AURICULAR (OTIC)
Qty: 8 | Refills: 0 | Status: ON HOLD | COMMUNITY
Start: 2020-05-18

## 2024-10-02 RX ORDER — ALPRAZOLAM 0.5 MG
TAKE 1 TABLET 3 TIMES DAILY AS NEEDED TABLET ORAL
Refills: 0 | Status: ON HOLD | COMMUNITY

## 2024-10-02 RX ORDER — NORETHINDRONE ACETATE AND ETHINYL ESTRADIOL 1; 20 MG/1; UG/1
TAKE 1 TABLET DAILY TABLET ORAL
Refills: 0 | Status: ACTIVE | COMMUNITY

## 2024-10-02 RX ORDER — PANTOPRAZOLE SODIUM 40 MG/1
1 TABLET TABLET, DELAYED RELEASE ORAL TWICE DAILY
OUTPATIENT

## 2024-10-02 RX ORDER — FAMOTIDINE 40 MG/1
1 TABLET AT BEDTIME TABLET, FILM COATED ORAL ONCE A DAY
OUTPATIENT
Start: 2024-08-30

## 2024-10-02 RX ORDER — FLUOXETINE HCL 20 MG
TAKE 1 CAPSULE DAILY CAPSULE ORAL
Refills: 0 | Status: ACTIVE | COMMUNITY

## 2024-10-02 RX ORDER — FAMOTIDINE 40 MG/1
1 TABLET AT BEDTIME TABLET, FILM COATED ORAL ONCE A DAY
Qty: 90 TABLET | Refills: 3 | Status: ACTIVE | COMMUNITY
Start: 2024-08-30

## 2024-10-02 RX ORDER — AMLODIPINE BESYLATE 5 MG/1
1 TABLET TABLET ORAL ONCE A DAY
Status: ON HOLD | COMMUNITY

## 2024-10-02 NOTE — HPI-TODAY'S VISIT:
Ms. Will is a 39-year-old female with a history of hypertension, hypothyroidism, GERD, gastroparesis, presenting for follow-up after EGD. She was seen in the office in July for interval follow-up regarding intermittent nausea and vomiting attributed to gastroparesis.  Her symptoms had been unchanged with dietary modification and PPI.  An upper endoscopy was planned for Jha's surveillance and she was to continue her regimen with twice daily pantoprazole.  Promethazine suppositories and Compazine suppositories were provided to use as needed for relief of nausea. It was encouraged that she avoid use of Reglan. A gastric pacemaker was not recommended. EGD 8/30/2024:Medium size 4 cm hiatal hernia, short segment Jha's esophagus from 34 to 35 cm, patulous lower esophageal sphincter, mild nonerosive antral gastritis.  Biopsies of the GE junction showed reflux type changes, negative for Jha's.  Antral biopsies were negative for H. pylori.  She was recommended the addition of famotidine 40 mg nightly to her regimen with pantoprazole.  Labs and abdominal ultrasound were planned. Labs 9/13/2024:H/H11.7/36.3, MCV 86, , WBC 8.4.  CMP unremarkable aside from BUN/creat 15/1.31.  Lipase 27.  Hemoglobin A1c 5.8. She did not have the abdominal ultrasound performed as she never heard from the facility to schedule.  Fortunately, her symptoms have entirely subsided at present.  She has not experienced any recent episodes of nausea or vomiting, and has not required antiemetics.  Her reflux symptoms are managed with twice daily pantoprazole and famotidine prior to bedtime.  No abdominal pain.

## 2024-10-02 NOTE — HPI-OTHER HISTORIES
EGD 2020 was notable for LA grade B esophagitis, and GE junction biopsies demonstrating focal intestinal metaplasia consistent with Jha's esophagus.    Labs 2020 were unremarkable for pancreas or liver disease, or celiac disease.   Colonoscopy was performed 9/3/2020 which was notable for an excellent bowel preparation, mild to moderate sigmoid diverticulosis, mild external and internal hemorrhoids, normal ileum, otherwise normal colonoscopy to the terminal ileum.  Gastric emptying study on 7/30/2020 demonstrated findings consistent with delayed gastric emptying with grade 4 (very severe) gastric retention at 4 hours.

## 2025-04-02 ENCOUNTER — OFFICE VISIT (OUTPATIENT)
Dept: URBAN - METROPOLITAN AREA CLINIC 107 | Facility: CLINIC | Age: 40
End: 2025-04-02
Payer: COMMERCIAL

## 2025-04-02 DIAGNOSIS — K31.84 GASTROPARESIS: ICD-10-CM

## 2025-04-02 DIAGNOSIS — K21.9 GERD WITHOUT ESOPHAGITIS: ICD-10-CM

## 2025-04-02 PROCEDURE — 99213 OFFICE O/P EST LOW 20 MIN: CPT | Performed by: NURSE PRACTITIONER

## 2025-04-02 RX ORDER — PROCHLORPERAZINE 25 MG/1
1 SUPPOSITORY AS NEEDED SUPPOSITORY RECTAL TWICE A DAY
Status: ON HOLD | COMMUNITY
Start: 2024-07-31

## 2025-04-02 RX ORDER — FLUOXETINE HCL 20 MG
TAKE 1 CAPSULE DAILY CAPSULE ORAL
Refills: 0 | Status: ACTIVE | COMMUNITY

## 2025-04-02 RX ORDER — FAMOTIDINE 40 MG/1
1 TABLET AT BEDTIME TABLET, FILM COATED ORAL ONCE A DAY
Status: ON HOLD | COMMUNITY
Start: 2024-08-30

## 2025-04-02 RX ORDER — CIPROFLOXACIN AND DEXAMETHASONE 3; 1 MG/ML; MG/ML
PLACE 4 TO 5 DROPS IN LEFT EAR TWICE A DAY FOR 7 DAYS SUSPENSION/ DROPS AURICULAR (OTIC)
Qty: 8 | Refills: 0 | Status: ON HOLD | COMMUNITY
Start: 2020-05-18

## 2025-04-02 RX ORDER — AMLODIPINE BESYLATE 5 MG/1
1 TABLET TABLET ORAL ONCE A DAY
Status: ON HOLD | COMMUNITY

## 2025-04-02 RX ORDER — SPIRONOLACTONE 25 MG/1
1 TABLET TABLET, FILM COATED ORAL
Status: ACTIVE | COMMUNITY

## 2025-04-02 RX ORDER — ALPRAZOLAM 0.5 MG
TAKE 1 TABLET 3 TIMES DAILY AS NEEDED TABLET ORAL
Refills: 0 | Status: ON HOLD | COMMUNITY

## 2025-04-02 RX ORDER — CLONIDINE HYDROCHLORIDE 0.1 MG/1
1 TABLET TABLET ORAL ONCE A DAY
Status: ACTIVE | COMMUNITY

## 2025-04-02 RX ORDER — ERGOCALCIFEROL 1.25 MG/1
TAKE 1 CAPSULE ONCE PER WEEK CAPSULE ORAL
Qty: 4 | Refills: 0 | Status: ON HOLD | COMMUNITY

## 2025-04-02 RX ORDER — PROMETHAZINE HYDROCHLORIDE AND DEXTROMETHORPHAN HYDROBROMIDE 6.25; 15 MG/5ML; MG/5ML
TAKE 5 ML BY MOUTH EVERY 6 HOURS AS NEEDED FOR COUGH FOR 3 DAYS SOLUTION ORAL
Qty: 60 | Refills: 0 | Status: ON HOLD | COMMUNITY
Start: 2019-05-28

## 2025-04-02 RX ORDER — NORETHINDRONE ACETATE AND ETHINYL ESTRADIOL 1; 20 MG/1; UG/1
TAKE 1 TABLET DAILY TABLET ORAL
Refills: 0 | Status: ACTIVE | COMMUNITY

## 2025-04-02 RX ORDER — PANTOPRAZOLE SODIUM 40 MG/1
1 TABLET TABLET, DELAYED RELEASE ORAL TWICE DAILY
Status: ACTIVE | COMMUNITY

## 2025-04-02 RX ORDER — PROMETHAZINE HYDROCHLORIDE 25 MG/1
_INSERT 1 SUPPOSITORY RECTALLY EVERY 6 HOURS AS NEEDED FOR NAUSEA/VOMIT SUPPOSITORY RECTAL
Status: ON HOLD | COMMUNITY
Start: 2020-05-12

## 2025-04-02 RX ORDER — LEVOTHYROXINE SODIUM 175 UG/1
TAKE 1 TABLET DAILY TABLET ORAL
Refills: 0 | Status: ACTIVE | COMMUNITY

## 2025-04-02 NOTE — HPI-TODAY'S VISIT:
Ms. Will is a 39-year-old female with a history of hypertension, hypothyroidism, GERD, gastroparesis, presenting for follow-up. She was seen in the office in October for follow-up regarding GERD and Jha's esophagus. Recent biopsies of the GE junction showed only reflux changes. Her symptoms were well managed with her regimen with pantoprazole and famotidine. A repeat upper endoscopy was recommended in 3-5 years for Jha's surveillance. Regarding gastroparesis, her symptoms were stable with diet and aforementioned acid suppressive regimen. She had promethazine and compazine suppositories on hand to use as needed, but had not required either medication recently. She was been encouraged a low-residue diet with smaller, more frequent meals. Overall, her symptoms are stable. She has occasional excess mucus in her throat in the mornings which she feels may be related to allergies. She denies any heartburn on her regimen with pantoprazole twice daily. She has discontinued famotidine. She denies abdominal pain, nausea or vomiting. Her bowel habits are regular wihtout blood per rectum.

## 2025-04-02 NOTE — HPI-OTHER HISTORIES
Labs 9/13/2024:H/H11.7/36.3, MCV 86, , WBC 8.4. CMP unremarkable aside from BUN/creat 15/1.31. Lipase 27. Hemoglobin A1c 5.8.  EGD 8/30/2024:Medium size 4 cm hiatal hernia, short segment Jha's esophagus from 34 to 35 cm, patulous lower esophageal sphincter, mild nonerosive antral gastritis. Biopsies of the GE junction showed reflux type changes, negative for Jha's. Antral biopsies were negative for H. pylori. She was recommended the addition of famotidine 40 mg nightly to her regimen with pantoprazole.   EGD 2020 was notable for LA grade B esophagitis, and GE junction biopsies demonstrating focal intestinal metaplasia consistent with Jha's esophagus.    Labs 2020 were unremarkable for pancreas or liver disease, or celiac disease.   Colonoscopy was performed 9/3/2020 which was notable for an excellent bowel preparation, mild to moderate sigmoid diverticulosis, mild external and internal hemorrhoids, normal ileum, otherwise normal colonoscopy to the terminal ileum.  Gastric emptying study on 7/30/2020 demonstrated findings consistent with delayed gastric emptying with grade 4 (very severe) gastric retention at 4 hours.